# Patient Record
Sex: FEMALE | Race: WHITE | NOT HISPANIC OR LATINO | URBAN - METROPOLITAN AREA
[De-identification: names, ages, dates, MRNs, and addresses within clinical notes are randomized per-mention and may not be internally consistent; named-entity substitution may affect disease eponyms.]

---

## 2022-04-11 ENCOUNTER — TELEHEALTH PROVIDED OTHER THAN IN PATIENT'S HOME (OUTPATIENT)
Dept: URBAN - METROPOLITAN AREA TELEHEALTH 7 | Facility: TELEHEALTH | Age: 37
End: 2022-04-11

## 2022-04-11 VITALS — WEIGHT: 144 LBS | HEIGHT: 62 IN

## 2022-04-11 DIAGNOSIS — R63.4 ABNORMAL WEIGHT LOSS: ICD-10-CM

## 2022-04-11 DIAGNOSIS — R10.9 UNSPECIFIED ABDOMINAL PAIN: ICD-10-CM

## 2022-04-11 DIAGNOSIS — K62.5 HEMORRHAGE OF ANUS AND RECTUM: ICD-10-CM

## 2022-04-11 PROCEDURE — 99204 OFFICE O/P NEW MOD 45 MIN: CPT

## 2022-04-11 NOTE — SERVICEHPINOTES
PATIENT VERIFIED BY DATE OF BIRTH AND NAME. Patient has been consented for this telecommunication visit.
   MANUEL MCNAMARA   is a   36   year old    female who is being seen in consultation at the request of   LIANNE PAN   for change in bowel habits. Patient reports problem started in October 2021 when she moved to [place] from [place]. She notes there was 13 lbs weight loss by December without dieting or exercising. During that period she experienced constipation, with "sticky poor" and brp on wipe and with stool. She believes there are hemorrhoids, internally as she cannot feel them, but there is mild bleeding and itching and feels like "puffiness". Currently bowel habits are back to normal, but she has been having increased lower back pain and stomach pain and cramping.
atul
brNo recent bloodwork other than thyroid studies recently re-started levothyroxine.
br Denies nausea, vomiting, melena, fevers, chills, night sweats.
br No family hx of IBD or GI malignancy.atul

## 2022-04-20 ENCOUNTER — OFFICE (OUTPATIENT)
Dept: URBAN - METROPOLITAN AREA CLINIC 79 | Facility: CLINIC | Age: 37
End: 2022-04-20

## 2022-04-20 PROCEDURE — 00031: CPT | Performed by: INTERNAL MEDICINE

## 2022-05-05 ENCOUNTER — OFFICE (OUTPATIENT)
Dept: URBAN - METROPOLITAN AREA CLINIC 30 | Facility: CLINIC | Age: 37
End: 2022-05-05

## 2022-05-05 VITALS
HEART RATE: 79 BPM | WEIGHT: 145 LBS | SYSTOLIC BLOOD PRESSURE: 94 MMHG | HEART RATE: 81 BPM | OXYGEN SATURATION: 97 % | OXYGEN SATURATION: 96 % | DIASTOLIC BLOOD PRESSURE: 67 MMHG | DIASTOLIC BLOOD PRESSURE: 80 MMHG | SYSTOLIC BLOOD PRESSURE: 109 MMHG | DIASTOLIC BLOOD PRESSURE: 87 MMHG | SYSTOLIC BLOOD PRESSURE: 104 MMHG | RESPIRATION RATE: 16 BRPM | HEIGHT: 62 IN | TEMPERATURE: 98.1 F | RESPIRATION RATE: 20 BRPM | HEART RATE: 84 BPM | DIASTOLIC BLOOD PRESSURE: 62 MMHG | DIASTOLIC BLOOD PRESSURE: 58 MMHG | RESPIRATION RATE: 24 BRPM | SYSTOLIC BLOOD PRESSURE: 130 MMHG | DIASTOLIC BLOOD PRESSURE: 64 MMHG | HEART RATE: 83 BPM | SYSTOLIC BLOOD PRESSURE: 116 MMHG | RESPIRATION RATE: 14 BRPM | HEART RATE: 82 BPM | DIASTOLIC BLOOD PRESSURE: 69 MMHG | RESPIRATION RATE: 26 BRPM | OXYGEN SATURATION: 98 % | SYSTOLIC BLOOD PRESSURE: 100 MMHG | TEMPERATURE: 97.9 F | RESPIRATION RATE: 23 BRPM | HEART RATE: 95 BPM | OXYGEN SATURATION: 100 % | SYSTOLIC BLOOD PRESSURE: 113 MMHG

## 2022-05-05 DIAGNOSIS — K92.2 GASTROINTESTINAL HEMORRHAGE, UNSPECIFIED: ICD-10-CM

## 2022-05-05 RX ORDER — WHEAT DEXTRIN 3 G/4 G
POWDER (GRAM) ORAL
Qty: 1 | Refills: 0 | Status: ACTIVE
Start: 2022-05-05